# Patient Record
Sex: MALE | Race: WHITE | NOT HISPANIC OR LATINO | Employment: FULL TIME | URBAN - METROPOLITAN AREA
[De-identification: names, ages, dates, MRNs, and addresses within clinical notes are randomized per-mention and may not be internally consistent; named-entity substitution may affect disease eponyms.]

---

## 2017-08-11 ENCOUNTER — TRANSCRIBE ORDERS (OUTPATIENT)
Dept: ADMINISTRATIVE | Facility: HOSPITAL | Age: 52
End: 2017-08-11

## 2017-08-11 DIAGNOSIS — D33.4 SCHWANNOMA OF SPINAL CORD (HCC): Primary | ICD-10-CM

## 2017-11-02 ENCOUNTER — HOSPITAL ENCOUNTER (OUTPATIENT)
Dept: MRI IMAGING | Facility: HOSPITAL | Age: 52
Discharge: HOME | End: 2017-11-02
Attending: NEUROLOGICAL SURGERY
Payer: COMMERCIAL

## 2017-11-02 DIAGNOSIS — Z09 ENCOUNTER FOR FOLLOW-UP EXAMINATION AFTER COMPLETED TREATMENT FOR CONDITIONS OTHER THAN MALIGNANT NEOPLASM: ICD-10-CM

## 2017-11-02 DIAGNOSIS — D33.4 BENIGN NEOPLASM OF SPINAL CORD (HCC): ICD-10-CM

## 2017-11-02 PROCEDURE — 72158 MRI LUMBAR SPINE W/O & W/DYE: CPT

## 2017-11-02 PROCEDURE — A9585 GADOBUTROL INJECTION: HCPCS | Performed by: NEUROLOGICAL SURGERY

## 2017-11-02 RX ADMIN — GADOBUTROL 9 ML: 604.72 INJECTION INTRAVENOUS at 10:56

## 2017-11-07 ENCOUNTER — ALLSCRIPTS OFFICE VISIT (OUTPATIENT)
Dept: OTHER | Facility: OTHER | Age: 52
End: 2017-11-07

## 2017-11-10 NOTE — PROGRESS NOTES
Assessment  1  History of Schwannoma of spinal cord (225 3) (D33 4)  2  History of Back Surgery    Plan     · Follow-up PRN Evaluation and Treatment  Follow-up  Status: Complete  Done:07Nov2017  Ordered; For: PMH: Back Surgery, PMH: Schwannoma of spinal cord;  Ordered By: Alfreda Connors  Performed:   Due: [id number]    Discussion/Summary    This is a 45year old male who is s/p T11-L3 Open pedicle screw and amaya fixation fusion, reduction of L1 fracture and dural repair, resection of intradural T12 schwannoma on 04/20/10 by Dr Amado Orellana  Pathology was consistent with Schwannoma (WHO Grade I) with ancient change & evidence of prior hemorrhage)  Neris Phoenix is doing very well clinically and has no complaints at this time  Tobias Hernandez reviewed with Mr Neris Phoenix his lumbar spine MRI (11/2/17) and compared to pre-operative MRI (4/18/10)  Dr Tobias Hernandez explained to Mr Neris Phoenix that there is no recurrence of schwannoma on his current L-spine MRI  His spinal alignment is stable  Neurosurgical intervention is not indicated at this juncture  this juncture Mr Neris Phoenix is released to follow up with our office on an as needed basis from a neurosurgical standpoint  Patient advised to contact our office if he experiences new back pain or other neurological change  Neris Phoenix expressed understanding and agreement  The patient was counseled regarding diagnostic results,-- instructions for management,-- impressions  The patient has the current Goals: Review L-spine mri  The patent has the current Barriers: None  Patient is able to Self-Care  The treatment plan was reviewed with the patient/guardian  The patient/guardian understands and agrees with the treatment plan      Chief Complaint  Follow up s/p MRI L-spine      History of Present Illness  In review, Mr Neris Phoenix is a 46 y/o gentleman whom in April 2010 was on a ladder when he was cutting a limb  The limb swung, hit him, knocked him off his balance, and he fell striking his back   He had severe and immediate back pain  The patient was taken to the emergency department  He was found to have an L1 compression fracture and there was an intradural T12 region schwannoma  underwent T11-L3 Open pedicle screw and amaya fixation fusion, reduction of L1 fracture and dural repair, resection of intradural T12 schwannoma on 04/20/10 by Dr Jemal Grimes  Pathology was consistent with Schwannoma (WHO Grade I) with ancient change & evidence of prior hemorrhage)  Post-op he wore a back brace for several weeks  he last saw Dr Gerson Galaviz in November 2012 he was advised to follow up in 5 years with L-spine MRI for follow up for history of schwannoma resection  had requested L-spine MRI completed 11/2/17 and presents for follow up  thoracolumbar scar is maturely healed  has no complaints  denies back pain  He denies sensory disturbances of upper extremities, torso, and lower extremities  He denies weakness of his upper extremities or lower extremities  He denies pain or spasm of his low extremities  He denies any give out of lower extremities  He denies any difficulty walking  He ambulates independent  He denies any bladder / bowel or sexual dysfunction  He reports completing his ADL's independent  He is employed working in purchasing  Review of Systems   Constitutional: No fever or chills, feels well, no tiredness, no recent weight gain or weight loss  Eyes: No complaints of eye pain, no red eyes, no discharge from eyes, no itchy eyes  ENT: no complaints of earache, no hearing loss, no nosebleeds, no nasal discharge, no sore throat, no hoarseness  Cardiovascular: No complaints of slow heart rate, no fast heart rate, no chest pain, no palpitations, no leg claudication, no lower extremity  Respiratory: No complaints of shortness of breath, no wheezing, no cough, no SOB on exertion, no orthopnea or PND  Gastrointestinal: No complaints of abdominal pain, no constipation, no nausea or vomiting, no diarrhea or bloody stools  Genitourinary: No complaints of dysuria, no incontinence, no hesitancy, no nocturia, no genital lesion, no testicular pain  Musculoskeletal: No complaints of arthralgia, no myalgias, no joint swelling or stiffness, no limb pain or swelling  Integumentary: No complaints of skin rash or skin lesions, no itching, no skin wound, no dry skin  Neurological: No compliants of headache, no confusion, no convulsions, no numbness or tingling, no dizziness or fainting, no limb weakness, no difficulty walking  Psychiatric: Is not suicidal, no sleep disturbances, no anxiety or depression, no change in personality, no emotional problems  Endocrine: No complaints of proptosis, no hot flashes, no muscle weakness, no erectile dysfunction, no deepening of the voice, no feelings of weakness  Hematologic/Lymphatic: No complaints of swollen glands, no swollen glands in the neck, does not bleed easily, no easy bruising  ROS reviewed  Active Problems  1  Follow-up examination following surgery (V67 00) (Z09)  2  Intervertebral disc protrusion (722 2)  3  Low back pain (724 2) (M54 5)  4  Lumbar spondylolysis (738 4) (M43 06)  5  Special screening examination for neoplasm of prostate (V76 44) (Z12 5)  6  Spondylolisthesis of lumbosacral region (738 4) (M43 17)    Past Medical History  1  History of Schwannoma of spinal cord (225 3) (D33 4)  2  History of Traumatic burst fracture of lumbar vertebra (805 4) (S32 001A)  3  History of Urinary retention (788 20) (R33 9)    The active problems and past medical history were reviewed and updated today  Surgical History  1  History of Back Surgery  2  History of Hand Surgery    The surgical history was reviewed and updated today  Family History  Mother   1  Family history of Healthy adult  Father   2  Family history of Acinar Cell Carcinoma Of The Prostate Gland (V16 42)  3  Family history of Prostate cancer  Maternal Grandmother   4   Family history of Old age  Paternal Grandmother   5  Family history of Old age  Maternal Grandfather   10  Family history of Old age  Paternal Grandfather   9  Family history of Old age    The family history was reviewed and updated today  Social History   · Being A Social Drinker   · Denied: Drug Use (305 90)   · Lives with spouse   · Marital History - Currently    · Never A Smoker   · Occupation   · Two children  The social history was reviewed and updated today  Current Meds  1  No Reported Medications  Requested for: [id number] Recorded    The medication list was reviewed and updated today  Allergies  1  No Known Drug Allergies    Vitals  Vital Signs    Recorded: [id number] 02:19PM Recorded: 45WAS0166 02:11PM   Temperature 97 2 F    Respiration  10   Systolic  664   Diastolic  478   Height  5 ft 11 in   Weight  211 lb    BMI Calculated  29 43   BSA Calculated  2 16   Pain Scale  0       Physical Exam    Constitutional Patient appears healthy and well developed  No signs of acute distress present  Respiratory Respiratory effort: Normal   Musculo: Spine Contour is normal  No tenderness of the spine billaterally  Thoracic Spine examination demonstrates no visible abnormailities,-- normal kyphosis-- and-- no spine tenderness on palpation  Lumbar/Sacral Spine examination demonstrates no visible abnormailities,-- normal lordosis-- and-- no spine tenderness on palpation  Skin Mature thoracolumbar spine scar  Neurologic - Mental Status: Alert and Oriented x3  -- Speech is articulated and fluent  -- Grossly nonfocal   Motor System - Upper Extremities: Normal to inspection and palpation  Strength: Deltoids 5/5 bilaterally  Biceps 5/5 bilaterally  Triceps 5/5 bilaterally  Extensor carpi radials is 5/5 bilaterally  Extensor digitorum 5/5 bilaterally  Intrinsic 5/5 bilaterally   5/5 bilaterally  Motor System - Lower Extremities: Normal to inspection and palpation  Strength: iliopsoas 5/5 bilaterally  Quadriceps 5/5 bilaterally  Hamstrings 5/5 bilaterally  Gastrocnemius 5/5 bilaterally  -- Anterior tibialis 5/5 bilaterally  EHL 5/5 bilaterally  Reflexes: Biceps reflexes are 2+ bilaterally  Triceps reflexes are 2+ bilaterally  Achilles reflexes are 2+ bilaterally  Babinski's reflex is 2+ down going bilaterally  Ankle clonus is absent bilaterally  Deep tendon reflexes: 2+ right brachioradialis,-- 2+ left brachioradialis,-- 2+ right patella,-- 2+ left patella,-- no ankle clonus on the right-- and-- no ankle clonus on the left  Superficial/Primitive Reflexes: Babinski reflex absent on the right-- and-- Babinski reflex absent on the left  Coordination: Involuntary movements: None   Sensory: Sensation grossly intact to light touch  Sensation grossly intact to light touch  -- Pinprick intact upper extremities, torso, and lower extremities  JPS and Vibratory sense intact bilateral thumbs and great toes  Gait and Station: [place] with a normal gait  -- Independent  Tandem walk intact  Results/Data  Diagnostic Studies Reviewed [facility]:  I personally reviewed the lumbar mri in detail with the patient  * MRI LUMBAR SPINE W WO CONTRAST [id number] 09:33AM Birgit Has Order Number: [id number]   - Patient Instructions: To schedule this appointment, please contact Central Scheduling at [phone]  Test Name Result Flag Reference   MRI LUMBAR SPINE W [address] (Report)       MRI LUMBAR SPINE WITH AND WITHOUT CONTRAST   INDICATION:      COMPARISON: None  TECHNIQUE: Sagittal T1, sagittal T2, sagittal inversion recovery, axial T1 and axial T2, coronal T2  Sagittal and axial T1 postcontrast    IV Contrast: 9 mL of gadobutrol injection (MULTI-DOSE)    IMAGE QUALITY: Diagnostic   FINDINGS:   ALIGNMENT: Patient status post fusion with bilateral pedicle screws at T11, T12, L2, and L3 spanning the L1 compression deformity  Bilateral L5 pars defects result in grade 1 spondylolisthesis     MARROW SIGNAL: Superior endplate Schmorl's node L1  No significant retropulsion at the L1 compression fracture with stable height from prior study  Sherre Soulier DISTAL CORD AND CONUS: Normal size and signal of the distal cord and conus  The conus ends at the L1 level  PARASPINAL SOFT TISSUES: Paraspinal soft tissues are unremarkable  SACRUM: Normal signal within the sacrum  No evidence of insufficiency or stress fracture  LOWER THORACIC DISC SPACES: Normal disc height and signal  No disc herniation, canal stenosis or foraminal narrowing  LUMBAR DISC SPACES:      L1-L2: Normal    L2-L3: Normal    L3-L4: Mild facet hypertrophy with minimal annular bulge  No significant central or foraminal narrowing  L4-L5: Mild annular bulge with mild facet hypertrophy  There is a small left foraminal protrusion type disc herniation resulting in mild left foraminal narrowing  Disc material contacts the left L4 foraminal nerve root  Correlate for left L4   radiculopathy  L5-S1: Grade 1 spondylolisthesis secondary to bilateral pars defects  There is uncovering of the posterior disc margin  Minimal left foraminal narrowing  POSTCONTRAST IMAGING: No abnormal enhancement  IMPRESSION:   Stable postoperative alignment status post pedicle screw fixation at the thoracolumbar junction  Chronic L1 compression fracture noted with minimal retropulsion stable in stature from the prior study  Small left foraminal disc herniation L4-5 contacts the foraminal left L4 nerve root  Correlate for left L4 radiculopathy  Bilateral L5 pars defects results in stable grade 1 spondylolisthesis  Workstation performed: [id number]   Signed by:  Bindu Tirado DO  11/2/17       Attending Note  Collaborating Physician:1  I interviewed and examined the patient1 ,-- I supervised the Advanced Practitioner1 -- and-- I agree with the Advanced Practitioner note1   Agree with Advanced Practitioner Note Except:1  Images were carefully compared with his previous studies   There is no sign of return of the schwannoma  The fracture at the thoracolumbar junction is stable and fused  The examination is stable  His station and gait are appropriate and stable  1         1 Amended By: Lang Burns;  Nov 09 2017 2:10 PM EST    Signatures   Electronically signed by : Maday Carrillo, [facility]; Nov 8 2017  5:48AM EST                       (Author)    Electronically signed by : Faye Barthel, M D ; Nov 9 2017  2:10PM EST                       (Author)

## 2018-01-14 VITALS
BODY MASS INDEX: 29.54 KG/M2 | WEIGHT: 211 LBS | DIASTOLIC BLOOD PRESSURE: 100 MMHG | TEMPERATURE: 97.2 F | SYSTOLIC BLOOD PRESSURE: 130 MMHG | RESPIRATION RATE: 10 BRPM | HEIGHT: 71 IN

## 2020-06-18 ENCOUNTER — OFFICE VISIT (OUTPATIENT)
Dept: URGENT CARE | Facility: CLINIC | Age: 55
End: 2020-06-18
Payer: COMMERCIAL

## 2020-06-18 VITALS
TEMPERATURE: 97.7 F | BODY MASS INDEX: 28 KG/M2 | WEIGHT: 200 LBS | HEART RATE: 87 BPM | OXYGEN SATURATION: 97 % | HEIGHT: 71 IN | RESPIRATION RATE: 14 BRPM

## 2020-06-18 DIAGNOSIS — R05.9 COUGH: Primary | ICD-10-CM

## 2020-06-18 PROCEDURE — 99213 OFFICE O/P EST LOW 20 MIN: CPT | Performed by: FAMILY MEDICINE

## 2020-06-18 PROCEDURE — U0003 INFECTIOUS AGENT DETECTION BY NUCLEIC ACID (DNA OR RNA); SEVERE ACUTE RESPIRATORY SYNDROME CORONAVIRUS 2 (SARS-COV-2) (CORONAVIRUS DISEASE [COVID-19]), AMPLIFIED PROBE TECHNIQUE, MAKING USE OF HIGH THROUGHPUT TECHNOLOGIES AS DESCRIBED BY CMS-2020-01-R: HCPCS | Performed by: FAMILY MEDICINE

## 2020-06-20 LAB — SARS-COV-2 RNA SPEC QL NAA+PROBE: NOT DETECTED

## 2021-04-09 ENCOUNTER — APPOINTMENT (OUTPATIENT)
Dept: RADIOLOGY | Facility: CLINIC | Age: 56
End: 2021-04-09
Payer: COMMERCIAL

## 2021-04-09 ENCOUNTER — OFFICE VISIT (OUTPATIENT)
Dept: URGENT CARE | Facility: CLINIC | Age: 56
End: 2021-04-09
Payer: COMMERCIAL

## 2021-04-09 VITALS
TEMPERATURE: 97.6 F | BODY MASS INDEX: 29.26 KG/M2 | WEIGHT: 209 LBS | OXYGEN SATURATION: 98 % | HEART RATE: 85 BPM | HEIGHT: 71 IN | DIASTOLIC BLOOD PRESSURE: 70 MMHG | RESPIRATION RATE: 18 BRPM | SYSTOLIC BLOOD PRESSURE: 118 MMHG

## 2021-04-09 DIAGNOSIS — S92.515A NONDISPLACED FRACTURE OF PROXIMAL PHALANX OF LEFT LESSER TOE(S), INITIAL ENCOUNTER FOR CLOSED FRACTURE: Primary | ICD-10-CM

## 2021-04-09 DIAGNOSIS — S99.922A TOE INJURY, LEFT, INITIAL ENCOUNTER: ICD-10-CM

## 2021-04-09 PROCEDURE — 73660 X-RAY EXAM OF TOE(S): CPT

## 2021-04-09 PROCEDURE — 99213 OFFICE O/P EST LOW 20 MIN: CPT | Performed by: FAMILY MEDICINE

## 2021-04-09 NOTE — PROGRESS NOTES
[address]        NAME: Camilo Flood is a 63 y o  male  : 1965    MRN: [id number]  DATE: 2021  TIME: 5:42 PM    Assessment and Plan   Nondisplaced fracture of proximal phalanx of left lesser toe(s), initial encounter for closed fracture [S92 515A]  1  Nondisplaced fracture of proximal phalanx of left lesser toe(s), initial encounter for closed fracture  XR toe left third min 2 views    Ambulatory referral to Podiatry     Fracture of the left middle toe proximal phalanx; official read pending  Buddy-taped and placed in a hard sole shoe and referred to Podiatry for further evaluation and management  Recommend icing at least twice a day and OTC pain meds as needed  Patient Instructions     Follow up with PCP in 3-5 days  Proceed to  ER if symptoms worsen  Chief Complaint     Chief Complaint   Patient presents with    Toe Injury     Pt here  for  left  3rd toe injury, pt states  he smashed it into a door,  3 days ago  Pain  6/10 when  walking  Pt used Tylenol  History of Present Illness       58-year-old male presents today due to left middle toe pain from direct trauma which occurred 3 days ago  While walking he accidentally kicked the edge of a door causing a lot of pain  Afterwards, he iced the area  Reports improved pain, but is concerned that it continues to persist   Presents today for rule out of fracture  Denies any other symptoms  Review of Systems   Review of Systems   Constitutional: Negative for chills and fever  Respiratory: Negative for cough, shortness of breath and wheezing  Cardiovascular: Negative for chest pain  Gastrointestinal: Negative for abdominal pain, nausea and vomiting  Musculoskeletal: Positive for arthralgias, gait problem and joint swelling  Skin: Positive for color change  Neurological: Negative for dizziness and headaches  Current Medications     No current outpatient medications on file      Current Allergies Allergies as of 04/09/2021    (No Known Allergies)            The following portions of the patient's history were reviewed and updated as appropriate: allergies, current medications, past family history, past medical history, past social history, past surgical history and problem list      Past Medical History:   Diagnosis Date    Patient denies medical problems        Past Surgical History:   Procedure Laterality Date    BACK SURGERY  2010    SHOULDER SURGERY Left     4 years ago       Family History   Problem Relation Age of Onset    Heart disease Mother     Cancer Father          Medications have been verified  Objective   /70 (BP Location: Right arm, Patient Position: Sitting, Cuff Size: Standard)   Pulse 85   Temp 97 6 °F (36 4 °C) (Tympanic)   Resp 18   Ht 5' 11" (1 803 m)   Wt 94 8 kg (209 lb)   SpO2 98%   BMI 29 15 kg/m²   No LMP for male patient  Physical Exam     Physical Exam  Vitals signs and nursing note reviewed  Constitutional:       General: He is in acute distress (Antalgic gait)  Appearance: Normal appearance  He is normal weight  He is not ill-appearing, toxic-appearing or diaphoretic  HENT:      Head: Normocephalic and atraumatic  Eyes:      General:         Right eye: No discharge  Left eye: No discharge  Conjunctiva/sclera: Conjunctivae normal    Pulmonary:      Effort: Pulmonary effort is normal    Musculoskeletal:         General: Tenderness (Proximal phalanx, left 3rd toe) present  No swelling or deformity  Skin:     General: Skin is warm  Findings: Bruising present  No erythema  Neurological:      General: No focal deficit present  Mental Status: He is alert and oriented to person, place, and time  Psychiatric:         Mood and Affect: Mood normal          Behavior: Behavior normal          Thought Content:  Thought content normal          Judgment: Judgment normal

## 2021-04-15 ENCOUNTER — OFFICE VISIT (OUTPATIENT)
Dept: PODIATRY | Facility: CLINIC | Age: 56
End: 2021-04-15
Payer: COMMERCIAL

## 2021-04-15 VITALS
HEART RATE: 85 BPM | HEIGHT: 71 IN | DIASTOLIC BLOOD PRESSURE: 70 MMHG | RESPIRATION RATE: 18 BRPM | SYSTOLIC BLOOD PRESSURE: 118 MMHG | WEIGHT: 209 LBS | BODY MASS INDEX: 29.26 KG/M2

## 2021-04-15 DIAGNOSIS — S92.515A CLOSED NONDISPLACED FRACTURE OF PROXIMAL PHALANX OF LESSER TOE OF LEFT FOOT, INITIAL ENCOUNTER: Primary | ICD-10-CM

## 2021-04-15 DIAGNOSIS — M79.672 LEFT FOOT PAIN: ICD-10-CM

## 2021-04-15 DIAGNOSIS — S92.515A NONDISPLACED FRACTURE OF PROXIMAL PHALANX OF LEFT LESSER TOE(S), INITIAL ENCOUNTER FOR CLOSED FRACTURE: ICD-10-CM

## 2021-04-15 PROCEDURE — 99213 OFFICE O/P EST LOW 20 MIN: CPT | Performed by: PODIATRIST

## 2021-04-15 PROCEDURE — 73620 X-RAY EXAM OF FOOT: CPT | Performed by: PODIATRIST

## 2021-04-15 NOTE — PROGRESS NOTES
Assessment/Plan: history of injury left foot  Compression fracture proximal phalanx 3rd left toe  Pain upon ambulation  Plan  Foot exam performed  Patient educated on condition  X-rays taken  Patient can start transition into street shoe within the next week  If there is pain or problem he will return to postoperative shoe  He will transition slowly in take Tylenol p r n  Shen Kim There are no diagnoses linked to this encounter  Subjective:    Patient is seen on referral from the emergency room  Patient has history of injury to left foot  Fractured his middle toe of the left foot approximately 2 weeks prior  He presents in surgical shoe  Today he has only minimal pain with ambulation  No Known Allergies    No current outpatient medications on file  Patient Active Problem List   Diagnosis    Herniation of intervertebral disc    Low back pain    Lumbar spondylolysis    Spondylolisthesis of lumbosacral region    Cough          Patient ID: Anthony Echevarria is a 63 y o  male  HPI    The following portions of the patient's history were reviewed and updated as appropriate:     family history includes Cancer in his father; Heart disease in his mother  reports that he has never smoked  He has never used smokeless tobacco  He reports current alcohol use  He reports that he does not use drugs  Vitals:    04/15/21 1407   BP: 118/70   Pulse: 85   Resp: 18       Review of Systems      Objective:  Patient's shoes and socks removed     Foot Exam    General  General Appearance: appears stated age and healthy   Orientation: alert and oriented to person, place, and time   Affect: appropriate   Gait: antalgic       Right Foot/Ankle     Inspection and Palpation  Swelling: none   Arch: pes planus  Hammertoes: fifth toe    Neurovascular  Dorsalis pedis: 3+  Posterior tibial: 3+      Left Foot/Ankle      Inspection and Palpation  Ecchymosis: third toe  Tenderness: bony tenderness   Swelling: dorsum Arch: pes planus  Hammertoes: fifth toe    Neurovascular  Dorsalis pedis: 3+  Posterior tibial: 3+        Physical Exam  Vitals signs and nursing note reviewed  Neck:      Musculoskeletal: Normal range of motion and neck supple  Cardiovascular:      Pulses:           Dorsalis pedis pulses are 3+ on the right side and 3+ on the left side  Posterior tibial pulses are 3+ on the right side and 3+ on the left side  Musculoskeletal:      Left foot: Bony tenderness present  Comments:   3Rd toe left foot is straight and rectus  There is edema and erythema around the PIPJ  Patient has limited  Range of motion noted  X-ray  There is apparent transverse fracture through the surgical neck of the proximal phalanx 3rd left toe  No gapping noted  This appears to be consistent with compression fracture  Capital fragment aligned   Skin:     Capillary Refill: Capillary refill takes less than 2 seconds  Psychiatric:         Mood and Affect: Mood normal          Behavior: Behavior normal          Thought Content:  Thought content normal          Judgment: Judgment normal

## 2021-04-27 ENCOUNTER — OFFICE VISIT (OUTPATIENT)
Dept: PODIATRY | Facility: CLINIC | Age: 56
End: 2021-04-27
Payer: COMMERCIAL

## 2021-04-27 VITALS
DIASTOLIC BLOOD PRESSURE: 70 MMHG | RESPIRATION RATE: 17 BRPM | HEART RATE: 85 BPM | BODY MASS INDEX: 29.26 KG/M2 | HEIGHT: 71 IN | WEIGHT: 209 LBS | SYSTOLIC BLOOD PRESSURE: 118 MMHG

## 2021-04-27 DIAGNOSIS — S92.515D CLOSED NONDISPLACED FRACTURE OF PROXIMAL PHALANX OF LESSER TOE OF LEFT FOOT WITH ROUTINE HEALING, SUBSEQUENT ENCOUNTER: Primary | ICD-10-CM

## 2021-04-27 DIAGNOSIS — M79.672 LEFT FOOT PAIN: ICD-10-CM

## 2021-04-27 PROCEDURE — 73620 X-RAY EXAM OF FOOT: CPT | Performed by: PODIATRIST

## 2021-04-27 PROCEDURE — 99213 OFFICE O/P EST LOW 20 MIN: CPT | Performed by: PODIATRIST

## 2021-04-27 RX ORDER — MELOXICAM 7.5 MG/1
7.5 TABLET ORAL DAILY
Qty: 10 TABLET | Refills: 0 | Status: SHIPPED | OUTPATIENT
Start: 2021-04-27 | End: 2021-05-07

## 2021-04-27 NOTE — PROGRESS NOTES
Assessment/Plan: healing fracture 3rd left toe  Pain left foot  Approximately 3 weeks status post injury      Plan  Patient advised on condition  X-rays taken  He will transition to  Yemi Addi, sneaker  He will watch for increasing pain  Diagnoses and all orders for this visit:    Closed nondisplaced fracture of proximal phalanx of lesser toe of left foot with routine healing, subsequent encounter  -     meloxicam (MOBIC) 7 5 mg tablet; Take 1 tablet (7 5 mg total) by mouth daily for 10 days    Left foot pain  -     meloxicam (MOBIC) 7 5 mg tablet; Take 1 tablet (7 5 mg total) by mouth daily for 10 days          Subjective:  Patient is doing much better however he still gets pain and stiffness in the left foot  He is concerned about whether it is healing  He is in  [place] at this time  No Known Allergies      Current Outpatient Medications:     meloxicam (MOBIC) 7 5 mg tablet, Take 1 tablet (7 5 mg total) by mouth daily for 10 days, Disp: 10 tablet, Rfl: 0    Patient Active Problem List   Diagnosis    Herniation of intervertebral disc    Low back pain    Lumbar spondylolysis    Spondylolisthesis of lumbosacral region    Cough          Patient ID: Kylie Shoulder is a 63 y o  male  HPI    The following portions of the patient's history were reviewed and updated as appropriate:     family history includes Cancer in his father; Heart disease in his mother  reports that he has never smoked  He has never used smokeless tobacco  He reports current alcohol use  He reports that he does not use drugs  Vitals:    04/27/21 1006   BP: 118/70   Pulse: 85   Resp: 17       Review of Systems      Objective:  Patient's shoes and socks removed     Foot ExamPhysical Exam      General  General Appearance: appears stated age and healthy   Orientation: alert and oriented to person, place, and time   Affect: appropriate   Gait: antalgic         Right Foot/Ankle      Inspection and Palpation  Swelling: none Arch: pes planus  Hammertoes: fifth toe     Neurovascular  Dorsalis pedis: 3+  Posterior tibial: 3+        Left Foot/Ankle       Inspection and Palpation  Ecchymosis: third toe  Tenderness: bony tenderness   Swelling: dorsum   Arch: pes planus  Hammertoes: fifth toe     Neurovascular  Dorsalis pedis: 3+  Posterior tibial: 3+           Physical Exam  Vitals signs and nursing note reviewed  Neck:      Musculoskeletal: Normal range of motion and neck supple  Cardiovascular:      Pulses:           Dorsalis pedis pulses are 3+ on the right side and 3+ on the left side  Posterior tibial pulses are 3+ on the right side and 3+ on the left side  Musculoskeletal:      Left foot: Bony tenderness present  Comments:   3Rd toe left foot is straight and rectus  There is edema and erythema around the PIPJ  Patient has limited  Range of motion noted  X-ray  There is apparent transverse fracture through the surgical neck of the proximal phalanx 3rd left toe  No gapping noted  This appears to be consistent with compression fracture  Capital fragment   Aligned  Skin:     Capillary Refill: Capillary refill takes less than 2 seconds  Psychiatric:         Mood and Affect: Mood normal          Behavior: Behavior normal          Thought Content:  Thought content normal          Judgment: Judgment normal